# Patient Record
Sex: MALE | Race: BLACK OR AFRICAN AMERICAN | NOT HISPANIC OR LATINO | Employment: FULL TIME | URBAN - METROPOLITAN AREA
[De-identification: names, ages, dates, MRNs, and addresses within clinical notes are randomized per-mention and may not be internally consistent; named-entity substitution may affect disease eponyms.]

---

## 2022-04-27 ENCOUNTER — TRANSCRIBE ORDERS (OUTPATIENT)
Dept: PHYSICAL THERAPY | Facility: CLINIC | Age: 32
End: 2022-04-27

## 2022-04-27 DIAGNOSIS — S43.101A SEPARATION OF RIGHT ACROMIOCLAVICULAR JOINT, INITIAL ENCOUNTER: Primary | ICD-10-CM

## 2022-05-04 ENCOUNTER — TELEPHONE (OUTPATIENT)
Dept: PHYSICAL THERAPY | Facility: CLINIC | Age: 32
End: 2022-05-04

## 2022-05-10 ENCOUNTER — TELEPHONE (OUTPATIENT)
Dept: PHYSICAL THERAPY | Facility: CLINIC | Age: 32
End: 2022-05-10

## 2022-05-26 ENCOUNTER — TREATMENT (OUTPATIENT)
Dept: PHYSICAL THERAPY | Facility: CLINIC | Age: 32
End: 2022-05-26

## 2022-05-26 DIAGNOSIS — M25.511 CHRONIC RIGHT SHOULDER PAIN: Primary | ICD-10-CM

## 2022-05-26 DIAGNOSIS — G89.29 CHRONIC RIGHT SHOULDER PAIN: Primary | ICD-10-CM

## 2022-05-26 DIAGNOSIS — S43.101D SEPARATION OF RIGHT ACROMIOCLAVICULAR JOINT, SUBSEQUENT ENCOUNTER: ICD-10-CM

## 2022-05-26 PROCEDURE — 97110 THERAPEUTIC EXERCISES: CPT | Performed by: PHYSICAL THERAPIST

## 2022-05-26 PROCEDURE — 97161 PT EVAL LOW COMPLEX 20 MIN: CPT | Performed by: PHYSICAL THERAPIST

## 2022-05-26 NOTE — PROGRESS NOTES
Physical Therapy Initial Evaluation and Plan of Care    Patient: Rich Fletcher   : 1990  Diagnosis/ICD-10 Code:  Chronic right shoulder pain [M25.511, G89.29]  Referring practitioner: Salvador Reddy MD  Date of Initial Visit: 2022  Today's Date: 2022  Patient seen for 1 sessions           Subjective Questionnaire: QuickDASH: 27% impairment       Subjective Evaluation    History of Present Illness  Mechanism of injury: Pt reporting hurting his (R) shoulder years ago in 2017 and didn't have much done at the time but was told he had a pinched nerve in the shoulder. Then didn't do anything about it since then, and then he started a new job recently which exacerbated the pain in the shoulder.   Had imaging done but it is not available at time of eval.   Had an injection done on the shoulder a couple weeks ago.     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  Quality of life: good    Pain  Current pain ratin  At best pain ratin  At worst pain ratin  Quality: dull ache and discomfort  Relieving factors: change in position and medications  Aggravating factors: overhead activity, outstretched reach, repetitive movement, prolonged positioning and lifting    Patient Goals  Patient goals for therapy: decreased pain, increased motion, increased strength, independence with ADLs/IADLs and return to sport/leisure activities  Patient goal: get back to lifting weights (bench press,  press, push ups), throwing a ball            Objective          Postural Observations    Additional Postural  Observation Details  25 deg FHP at AC joint     Palpation   Left   Tenderness of the upper trapezius.     Right Tenderness of the upper trapezius.     Neurological Testing     Sensation     Shoulder   Left Shoulder   Intact: light touch    Right Shoulder   Intact: light touch    Active Range of Motion   Left Shoulder   Normal active range of motion    Right Shoulder   Normal active range of motion    Tests     Right Shoulder   Positive empty can, Hawkin's, passive horizontal adduction and sulcus sign.           Assessment & Plan     Assessment  Impairments: abnormal or restricted ROM, activity intolerance, impaired physical strength, lacks appropriate home exercise program and pain with function  Functional Limitations: carrying objects, lifting, pulling, pushing, uncomfortable because of pain, reaching behind back, reaching overhead and unable to perform repetitive tasks  Assessment details: Pt is a 31 yr/o male presenting with shoulder pain which began in 2017 and was recently exacerbated with work activities involving lifting. Per QuickDASH, he reports 27% impairment. He shows tenderness at the (R) AC joint, and positive signs for instability at the anterior shoulder as well as shoulder dyskinesia. Educated pt on importance of shoulder blade strength and reintegration of proper shoulder mechanics to prevent further injury, pt with good understanding. Educated on results of eval, as well as initial HEP. Pt with good understanding. Recommend skilled OPPT to address above issues, pt in agreement.   Prognosis: good    Goals  Plan Goals: STG: to be met within 6 visits   1. Pt to show (I) with initial HEP  2. 50% decrease in pain while lifting kids at home   3. Return to normal daily activity at work and at home with no increase in pain up to 20#   4. 50% decrease in pain with point tenderness to (R) AC joint     LTG: to be met by DC   1. Pt to be (I) with finalized HEP  2. Pt to report decreased impairment per  QuickDASH to less than 10% impairment.   3. Negative for shoulder special testing in (R) shoulder   4. Return to normal work outs including push ups and lifting with minimal to no increase in pain   5. Perform overhead lifting and throwing with no increase in pain     Plan  Therapy options: will be seen for skilled therapy services  Planned modality interventions: cryotherapy, electrical stimulation/Russian stimulation, thermotherapy (hydrocollator packs), ultrasound and dry needling  Planned therapy interventions: manual therapy, neuromuscular re-education, soft tissue mobilization, spinal/joint mobilization, strengthening, stretching, therapeutic activities, joint mobilization, home exercise program, functional ROM exercises, flexibility and body mechanics training  Frequency: 2x week  Duration in weeks: 13  Treatment plan discussed with: patient  Plan details: Depending on work schedule, may have weeks where he is only available 1x/week for in clinic session        History # of Personal Factors and/or Comorbidities: LOW (0)  Examination of Body System(s): # of elements: LOW (1-2)  Clinical Presentation: STABLE   Clinical Decision Making: LOW     Timed:         Manual Therapy:         mins  78410;     Therapeutic Exercise:    28     mins  61964;     Neuromuscular Alana:        mins  47840;    Therapeutic Activity:          mins  58904;     Gait Training:           mins  31405;     Ultrasound:          mins  27167;    Ionto                                   mins   90476  Self Care                            mins   50818  Canalith Repos         mins 95013      Un-Timed:  Electrical Stimulation:         mins  65367 ( );  Traction          mins 95712  Dry Needle                 ______ mins DRYNDL  Low Eval     14     Mins  14762  Mod Eval          Mins  58165  High Eval                            Mins  59144  Re-Eval                               mins  23591        Timed Treatment:   28   mins   Total Treatment:      50   mins    PT SIGNATURE: Susana Zepeda, SHAGGY   DATE TREATMENT INITIATED: 5/26/2022    Initial Certification  Certification Period: 5/26/2022 through 8/24/2022  I certify that the therapy services are furnished while this patient is under my care.  The services outlined above are required by this patient, and will be reviewed every 90 days.     PHYSICIAN: Salvador Reddy MD      DATE:     Please sign and return via fax to [phone]. Thank you, [facility].

## 2022-06-03 ENCOUNTER — TREATMENT (OUTPATIENT)
Dept: PHYSICAL THERAPY | Facility: CLINIC | Age: 32
End: 2022-06-03

## 2022-06-03 DIAGNOSIS — S43.101D SEPARATION OF RIGHT ACROMIOCLAVICULAR JOINT, SUBSEQUENT ENCOUNTER: ICD-10-CM

## 2022-06-03 DIAGNOSIS — M25.511 CHRONIC RIGHT SHOULDER PAIN: Primary | ICD-10-CM

## 2022-06-03 DIAGNOSIS — G89.29 CHRONIC RIGHT SHOULDER PAIN: Primary | ICD-10-CM

## 2022-06-03 PROCEDURE — 97530 THERAPEUTIC ACTIVITIES: CPT | Performed by: PHYSICAL THERAPIST

## 2022-06-03 PROCEDURE — 97110 THERAPEUTIC EXERCISES: CPT | Performed by: PHYSICAL THERAPIST

## 2022-06-03 NOTE — PROGRESS NOTES
Physical Therapy Daily Progress Note    VISIT#: 2    Subjective   Rich Fletcher reports: No issues since last session, not having much pain at home, but isn't doing his normal lifting yet.       Objective     See Exercise, Manual, and Modality Logs for complete treatment.     Patient Education: adding weight to prone scap strength     Assessment/Plan  Decreased space between AC joint pain area today compared to eval date. Able to tolerate greater resistance with prone scap strength, as well as initiation of plank press ups and cable column multidirectional pulling. No increase in pain during session. Will progress HEP as stated above. Pt with good understanding and good motivation.     Progress per Plan of Care        Timed:         Manual Therapy:         mins  78222;     Therapeutic Exercise:    16     mins  80913;     Neuromuscular Alana:        mins  43320;    Therapeutic Activity:     12     mins  23911;     Gait Training:           mins  59223;     Ultrasound:          mins  77690;    Ionto                                   mins   63363  Self Care                            mins   66448  Canalith Repos                   mins  38709    Un-Timed:  Electrical Stimulation:         mins  79083 ( );  Traction          mins 39623  Dry Needle                 ______ mins DRYNDL  Low Eval          Mins  06797  Mod Eval          Mins  53613  High Eval                            Mins  45446  Re-Eval                               mins  26334    Timed Treatment:   28   mins   Total Treatment:     28   mins    Susana Zepeda, PT    Physical Therapist

## 2022-06-09 ENCOUNTER — TREATMENT (OUTPATIENT)
Dept: PHYSICAL THERAPY | Facility: CLINIC | Age: 32
End: 2022-06-09

## 2022-06-09 DIAGNOSIS — G89.29 CHRONIC RIGHT SHOULDER PAIN: Primary | ICD-10-CM

## 2022-06-09 DIAGNOSIS — S43.101D SEPARATION OF RIGHT ACROMIOCLAVICULAR JOINT, SUBSEQUENT ENCOUNTER: ICD-10-CM

## 2022-06-09 DIAGNOSIS — M25.511 CHRONIC RIGHT SHOULDER PAIN: Primary | ICD-10-CM

## 2022-06-09 PROCEDURE — 97110 THERAPEUTIC EXERCISES: CPT | Performed by: PHYSICAL THERAPIST

## 2022-06-09 PROCEDURE — 97530 THERAPEUTIC ACTIVITIES: CPT | Performed by: PHYSICAL THERAPIST

## 2022-06-09 NOTE — PROGRESS NOTES
Physical Therapy Daily Progress Note    VISIT#: 3    Subjective   Rich Payneield reports: Shoulder is doing ok, does still feel the separation in the shoulder but it is not nearly as painful.       Objective     See Exercise, Manual, and Modality Logs for complete treatment.     Patient Education: progressions at home for strength and endurance in shoulder - importance of serratus musculature to promote scapular mobility and strength     Assessment/Plan  Pt tolerating significant increase in resistance and endurance training well today, including body weight ex on unstable surfaces. No pain during session,however pt did require ice at end of session for soreness relief. Will assess tolerance to progressions at next session.     Progress per Plan of Care        Timed:         Manual Therapy:         mins  71463;     Therapeutic Exercise:    16     mins  74274;     Neuromuscular Alana:        mins  27670;    Therapeutic Activity:     10     mins  66205;     Gait Training:           mins  24113;     Ultrasound:          mins  27282;    Ionto                                   mins   57550  Self Care                            mins   70586  Canalith Repos                   mins  69614    Un-Timed:  Electrical Stimulation:         mins  94646 ( );  Traction          mins 67940  Dry Needle                 ______ mins DRYNDL  Low Eval          Mins  29020  Mod Eval          Mins  00412  High Eval                            Mins  59362  Re-Eval                               mins  46713    Timed Treatment:   26   mins   Total Treatment:     40   mins    Susana Zepeda PT    Physical Therapist

## 2022-06-10 ENCOUNTER — TREATMENT (OUTPATIENT)
Dept: PHYSICAL THERAPY | Facility: CLINIC | Age: 32
End: 2022-06-10

## 2022-06-10 DIAGNOSIS — S43.101D SEPARATION OF RIGHT ACROMIOCLAVICULAR JOINT, SUBSEQUENT ENCOUNTER: ICD-10-CM

## 2022-06-10 DIAGNOSIS — M25.511 CHRONIC RIGHT SHOULDER PAIN: Primary | ICD-10-CM

## 2022-06-10 DIAGNOSIS — G89.29 CHRONIC RIGHT SHOULDER PAIN: Primary | ICD-10-CM

## 2022-06-10 PROCEDURE — 97530 THERAPEUTIC ACTIVITIES: CPT | Performed by: PHYSICAL THERAPIST

## 2022-06-10 PROCEDURE — 97110 THERAPEUTIC EXERCISES: CPT | Performed by: PHYSICAL THERAPIST

## 2022-06-10 NOTE — PROGRESS NOTES
Physical Therapy Daily Progress Note    VISIT#: 4    Subjective   Rich Fletcher reports: A little sore after last session yesterday but no increase in pain. Does still feel the separation feeling in the shoulder sometimes.       Objective     See Exercise, Manual, and Modality Logs for complete treatment.     Patient Education: avoiding lifting/ carrying heavy items / children without shoulder muscles being activated on right side     Assessment/Plan  Pt with improved ability to perform activation of shoulder musculature with TRX chest press and fly position. He did have one instance of shoulder feeling like it was  / suctioning feeling, however he was able to contract scapular muscle to combat this successfully.     Progress per Plan of Care        Timed:         Manual Therapy:         mins  68823;     Therapeutic Exercise:    17     mins  69871;     Neuromuscular Alana:        mins  56620;    Therapeutic Activity:     10     mins  17304;     Gait Training:           mins  45308;     Ultrasound:          mins  93875;    Ionto                                   mins   77112  Self Care                            mins   62200  Canalith Repos                   mins  10720    Un-Timed:  Electrical Stimulation:         mins  76937 ( );  Traction          mins 02868  Dry Needle                 ______ mins DRYNDL  Low Eval          Mins  63472  Mod Eval          Mins  85120  High Eval                            Mins  59820  Re-Eval                               mins  56333    Timed Treatment:   27   mins   Total Treatment:     27   mins    Susana Zepeda PT    Physical Therapist

## 2022-06-21 ENCOUNTER — TELEPHONE (OUTPATIENT)
Dept: PHYSICAL THERAPY | Facility: OTHER | Age: 32
End: 2022-06-21

## 2022-08-18 ENCOUNTER — DOCUMENTATION (OUTPATIENT)
Dept: PHYSICAL THERAPY | Facility: CLINIC | Age: 32
End: 2022-08-18

## 2022-08-18 NOTE — PROGRESS NOTES
Discharge Summary  Discharge Summary from Physical Therapy Report      Dates  PT visit: 5/26/22-6/10/22  Number of Visits: 4     Goals: All Met    Discharge Plan: Continue with current home exercise program as instructed  2  Comments : Pt cancelled remaining sessions. Clinic phoned pt and pt reporting he is doing fine and does not need anymore therapy. Will DC chart at this time. Pt not present for discharge, therefore no functional measures taken. See last note for most updated information.      Date of Discharge 8/18/22        Susana Zepeda, PT  Physical Therapist

## 2023-03-17 ENCOUNTER — DOCUMENTATION (OUTPATIENT)
Dept: PHYSICAL THERAPY | Facility: CLINIC | Age: 33
End: 2023-03-17
Payer: MEDICAID

## 2023-03-17 NOTE — PROGRESS NOTES
Discharge report was done on 8/18/22 but episode was not fully discharged on that date. Full discharge of episode of care at this time.

## 2023-06-14 ENCOUNTER — HOSPITAL ENCOUNTER (OUTPATIENT)
Facility: HOSPITAL | Age: 33
Discharge: HOME | End: 2023-06-14
Attending: PEDIATRICS
Payer: MEDICAID

## 2023-06-14 VITALS
OXYGEN SATURATION: 97 % | TEMPERATURE: 97.7 F | HEIGHT: 75 IN | HEART RATE: 81 BPM | WEIGHT: 255 LBS | DIASTOLIC BLOOD PRESSURE: 82 MMHG | RESPIRATION RATE: 16 BRPM | BODY MASS INDEX: 31.71 KG/M2 | SYSTOLIC BLOOD PRESSURE: 130 MMHG
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
## 2023-06-14 PROCEDURE — 63710000001 PREDNISONE PER 1 MG: Performed by: PEDIATRICS

## 2023-06-14 PROCEDURE — G0463 HOSPITAL OUTPT CLINIC VISIT: HCPCS | Performed by: PEDIATRICS

## 2023-06-14 RX ORDER — PREDNISONE 10 MG/1
TABLET ORAL
Qty: 21 TABLET | Refills: 0 | Status: SHIPPED | OUTPATIENT
Start: 2023-06-14 | End: 2023-06-20

## 2023-06-14 RX ORDER — CEPHALEXIN 500 MG/1
500 CAPSULE ORAL 4 TIMES DAILY
Qty: 20 CAPSULE | Refills: 0 | Status: SHIPPED | OUTPATIENT
Start: 2023-06-14 | End: 2023-06-19

## 2023-06-14 RX ORDER — PREDNISONE 20 MG/1
60 TABLET ORAL ONCE
Status: COMPLETED | OUTPATIENT
Start: 2023-06-14 | End: 2023-06-14

## 2023-06-14 RX ADMIN — PREDNISONE 60 MG: 20 TABLET ORAL at 20:55

## 2023-06-14 NOTE — Clinical Note
[facility] FS[facility] [place] [address] [place] [address] [place] [address]  Phone: [phone]    Rich Fletcher was seen and treated in our emergency department on 6/14/2023.  He may return to work on 06/17/2023.         Thank you for choosing [facility].    Alberto Pinto, DO

## 2023-06-15 NOTE — FSED PROVIDER NOTE
Emergency Medicine Evaluation Note  Subjective   History of Present Illness    HPI: Rich Fletcher is a 32 y.o. male who presents to the ED with rash that occurred roughly 3 days prior to presentation intermittent spots along the backs of knees bilaterally, lateral thigh, right inguinal canal region.  Patient states initially itching, states formed vesicular appearing lesions, states with serous drainage.  Patient denies any systemic signs of infection such as fevers, chills, nausea or vomiting.  No history of similar type symptoms.  No known exposures.  No immunocompromise status.  No OTC medications prior to arrival. No other concomitant symptoms. No other known aggravating or alleviating factors.      ROS  Review of Systems   Constitutional: Negative.    HENT: Negative.    Eyes: Negative.    Respiratory: Negative.    Cardiovascular: Negative.    Gastrointestinal: Negative.    Endocrine: Negative.    Genitourinary: Negative.    Musculoskeletal: Negative.    Skin: Positive for rash.   Allergic/Immunologic: Negative.    Neurological: Negative.    Hematological: Negative.    Psychiatric/Behavioral: Negative.        Previous History:  History reviewed. No pertinent past medical history.  History reviewed. No pertinent surgical history.  Social History     Tobacco Use   • Smoking status: Former     Types: Cigarettes   Substance Use Topics   • Alcohol use: Not Currently   • Drug use: Not Currently     History reviewed. No pertinent family history.  No Known Allergies  Current Outpatient Medications   Medication Instructions   • cephalexin (KEFLEX) 500 mg, Oral, 4 Times Daily   • predniSONE (DELTASONE) 10 MG tablet Take 6 tablets by mouth Daily for 1 day, THEN 5 tablets Daily for 1 day, THEN 4 tablets Daily for 1 day, THEN 3 tablets Daily for 1 day, THEN 2 tablets Daily for 1 day, THEN 1 tablet Daily for 1 day.       Objective   Physical Exam  Patient Vitals for the past 24 hrs:   BP Temp Temp src Pulse Resp SpO2  "Height Weight   06/14/23 2017 130/82 97.7 °F (36.5 °C) Oral 81 16 97 % 190.5 cm (75\") 116 kg (255 lb)     Physical Exam  Vitals and nursing note reviewed.   Constitutional:       General: He is not in acute distress.     Appearance: He is normal weight. He is not toxic-appearing.   HENT:      Head: Normocephalic and atraumatic.      Nose: Nose normal. No congestion or rhinorrhea.      Mouth/Throat:      Mouth: Mucous membranes are moist.      Pharynx: Oropharynx is clear. No oropharyngeal exudate.   Eyes:      General:         Right eye: No discharge.         Left eye: No discharge.      Extraocular Movements: Extraocular movements intact.      Conjunctiva/sclera: Conjunctivae normal.   Cardiovascular:      Rate and Rhythm: Normal rate and regular rhythm.      Pulses: Normal pulses.   Pulmonary:      Effort: Pulmonary effort is normal. No respiratory distress.   Abdominal:      General: Abdomen is flat.      Palpations: Abdomen is soft.   Musculoskeletal:         General: No swelling, tenderness, deformity or signs of injury. Normal range of motion.      Cervical back: Normal range of motion and neck supple. No rigidity.      Right lower leg: No edema.      Left lower leg: No edema.   Skin:     General: Skin is warm and dry.      Capillary Refill: Capillary refill takes less than 2 seconds.      Comments: Intermittent areas of less than 1 cm of blanchable erythema with central excoriation without palpable pocket of fluctuance.  No bleeding drainage, discharge or weep.  No surrounding streaking.  Compartments are soft.  No crepitus.  Roughly 3 lesions in right inguinal canal without involvement of perineal space.,  1 lesion on the lateral thigh each, one behind popliteal fossa.    Neurological:      General: No focal deficit present.      Mental Status: He is alert and oriented to person, place, and time.      Cranial Nerves: No cranial nerve deficit.      Sensory: No sensory deficit.      Motor: No weakness.      " Coordination: Coordination normal.      Gait: Gait normal.   Psychiatric:         Mood and Affect: Mood normal.         Behavior: Behavior normal.         Results  Labs Reviewed - No data to display  No orders to display     The laboratory results, imaging results and other diagnostic exam results were reviewed in the EMR.     Procedures  Procedures    Medical Decision Making    Patient presents to the ED as described above.  Vital signs stable and unremarkable.  Physical exam as described above.  Patient without any known exposure history, sporadic nature does not fit classic insect bite pattern however lesions appear consistent with insect bite, will initiate treatment with dose of steroids here, short prednisone taper.  Lesions do not look infected at this time, patient provided with paper prescription for Keflex in the event of bacterial superinfection, discussed when and how to properly initiate medication if necessary. Patient informed of results.  They verbalized understanding and agreement with treatment care plan.  Given strict return precautions.  All questions answered.    Diagnosis  Final diagnoses:   Insect bite, unspecified site, initial encounter       Disposition  ED Disposition     ED Disposition   Discharge    Condition   Stable    Comment   --           Erick Quick, DO  [address] [facility] [place] [address] [phone]    Call in 1 week  contunity of care    [place] [address] [facility] [address] [phone]  Go to   As needed, If symptoms worsen

## 2023-08-23 ENCOUNTER — HOSPITAL ENCOUNTER (OUTPATIENT)
Facility: HOSPITAL | Age: 33
Discharge: HOME | End: 2023-08-23
Attending: STUDENT IN AN ORGANIZED HEALTH CARE EDUCATION/TRAINING PROGRAM | Admitting: STUDENT IN AN ORGANIZED HEALTH CARE EDUCATION/TRAINING PROGRAM
Payer: MEDICAID

## 2023-08-23 VITALS
BODY MASS INDEX: 30.71 KG/M2 | DIASTOLIC BLOOD PRESSURE: 77 MMHG | OXYGEN SATURATION: 99 % | WEIGHT: 247 LBS | SYSTOLIC BLOOD PRESSURE: 135 MMHG | RESPIRATION RATE: 18 BRPM | TEMPERATURE: 98.7 F | HEIGHT: 75 IN | HEART RATE: 93 BPM

## 2023-08-23 DIAGNOSIS — U07.1 COVID-19: Primary | ICD-10-CM

## 2023-08-23 LAB
FLUAV SUBTYP SPEC NAA+PROBE: NOT DETECTED
FLUBV RNA ISLT QL NAA+PROBE: NOT DETECTED
SARS-COV-2 RNA RESP QL NAA+PROBE: DETECTED

## 2023-08-23 PROCEDURE — G0463 HOSPITAL OUTPT CLINIC VISIT: HCPCS

## 2023-08-23 PROCEDURE — 87636 SARSCOV2 & INF A&B AMP PRB: CPT | Performed by: STUDENT IN AN ORGANIZED HEALTH CARE EDUCATION/TRAINING PROGRAM

## 2023-08-23 NOTE — DISCHARGE INSTRUCTIONS
Thank you for letting us care for you today.  You can use Tylenol and ibuprofen as needed for pain and fever.  Drink plenty fluids and get rest.  You can use over-the-counter medications as needed for your symptoms.  Follow-up with your primary care provider.  Return for any new or worsening symptoms.  Quarantine at home for 5 days and wear a mask for 5 days from the onset of symptoms.      Wash/sanitize common household surfaces with antibacterial wipes.  Especially door knobs, light switches. Change bed linens and wash bath towels/washcloths. Frequent handwashing. Cough/sneeze into your sleeve. Treat fever every 6-8 hours with adult/children Tylenol (generic acetaminophen)

## 2023-08-23 NOTE — Clinical Note
[facility] [place] [address] [place] [address] [place] [address]  Phone: [phone]    Rich Fletcher was seen and treated in our emergency department on 8/23/2023.  He may return to work on 08/28/2023.  Quarantine at home until August 28 and then wear a mask for 5 days.       Thank you for choosing [facility].    Toya Ann APRN

## 2023-08-23 NOTE — FSED PROVIDER NOTE
[facility]    EMERGENCY DEPARTMENT ENCOUNTER    Room Number:  09/09  Date seen:  8/23/2023  Time seen: 17:38 EDT  PCP: Erick Quick DO  Historian: Patient    HPI:  Chief complaint: Cough  Context:Rich Fletcher is a 32 y.o. male who presents to the ED with c/o cough.  Patient reports that he has been having a global headache with fatigue and cough.  He reports he tested positive for COVID yesterday.  He reports that he is here today to get documentation of a positive COVID test.  He denies any chest pain or shortness of breath, nausea vomiting diarrhea or abdominal pain.  Patient is nontoxic in appearance.  He reports he has been taking over-the-counter medications with relief of some of his symptoms.     Timing: Constant  Duration: Since yesterday  Location: Global  Radiation: Nonradiating  Quality: Aching  Intensity/Severity: Mild  Associated Symptoms: Headache, fatigue, cough  Aggravating Factors: No known aggravating  Alleviating Factors: Over-the-counter medications  Treatment before arrival: OTC cold and flu medications    MEDICAL RECORD REVIEW  None reported    ALLERGIES  Patient has no known allergies.    PAST MEDICAL HISTORY  Active Ambulatory Problems     Diagnosis Date Noted    No Active Ambulatory Problems     Resolved Ambulatory Problems     Diagnosis Date Noted    No Resolved Ambulatory Problems     No Additional Past Medical History       PAST SURGICAL HISTORY  History reviewed. No pertinent surgical history.    FAMILY HISTORY  History reviewed. No pertinent family history.    SOCIAL HISTORY  Social History     Socioeconomic History    Marital status:    Tobacco Use    Smoking status: Some Days     Types: Cigarettes    Tobacco comments:     Currently trying to quit   Vaping Use    Vaping Use: Never used   Substance and Sexual Activity    Alcohol use: Not Currently    Drug use: Not Currently    Sexual activity: Defer       REVIEW OF SYSTEMS  Review  of Systems    All systems reviewed and negative except for those discussed in HPI.     PHYSICAL EXAM    I have reviewed the triage vital signs and nursing notes.    ED Triage Vitals   Temp Heart Rate Resp BP SpO2   08/23/23 1700 08/23/23 1638 08/23/23 1700 08/23/23 1700 08/23/23 1638   98.7 øF (37.1 øC) 101 18 135/77 98 %      Temp src Heart Rate Source Patient Position BP Location FiO2 (%)   08/23/23 1700 08/23/23 1638 08/23/23 1700 08/23/23 1700 --   Temporal Monitor;Right Sitting Right arm        Physical Exam  Constitutional:       Appearance: Normal appearance.   HENT:      Head: Normocephalic.      Right Ear: Tympanic membrane normal.      Left Ear: Tympanic membrane normal.      Nose: Nose normal.      Mouth/Throat:      Mouth: Mucous membranes are moist.      Pharynx: Oropharynx is clear.   Eyes:      Extraocular Movements: Extraocular movements intact.      Pupils: Pupils are equal, round, and reactive to light.   Cardiovascular:      Rate and Rhythm: Normal rate and regular rhythm.      Pulses: Normal pulses.      Heart sounds: Normal heart sounds.   Pulmonary:      Effort: Pulmonary effort is normal.      Breath sounds: Normal breath sounds.   Musculoskeletal:         General: Normal range of motion.      Cervical back: Normal range of motion.   Skin:     General: Skin is warm.   Neurological:      General: No focal deficit present.      Mental Status: He is alert.   Psychiatric:         Mood and Affect: Mood normal.         Behavior: Behavior normal.       Vital signs and nursing notes reviewed.        LAB RESULTS  Recent Results (from the past 24 hour(s))   COVID-19 and FLU A/B PCR - Swab, Nasopharynx    Collection Time: 08/23/23  5:04 PM    Specimen: Nasopharynx; Swab   Result Value Ref Range    COVID19 Detected (C) Not Detected - Ref. Range    Influenza A PCR Not Detected Not Detected    Influenza B PCR Not Detected Not Detected       Ordered the above labs and independently reviewed the  results.      RADIOLOGY RESULTS  No Radiology Exams Resulted Within Past 24 Hours       I ordered the above noted radiological studies. Independently reviewed by me and discussed with radiologist.  See dictation above for official radiology interpretation.      Orders placed during this visit:  Orders Placed This Encounter   Procedures    COVID-19 and FLU A/B PCR - Swab, Nasopharynx           PROCEDURES    Procedures        MEDICATIONS GIVEN IN ER    Medications - No data to display      PROGRESS, DATA ANALYSIS, CONSULTS, AND MEDICAL DECISION MAKING    All labs have been independently reviewed by me.  All radiology studies have been reviewed by me.   EKG's independently reviewed by me.  Discussion below represents my analysis of pertinent findings related to patient's condition, differential diagnosis, treatment plan and final disposition.    I rechecked the patient.  I discussed the patient's labs, radiology findings (including all incidental findings), diagnosis, and plan for discharge.  A repeat exam reveals no new worrisome changes from my initial exam findings.  The patient understands that the fact that they are being discharged does not denote that nothing is abnormal, it indicates that no clinical emergency is present and that they must follow-up as directed in order to properly maintain their health.  Follow-up instructions (specifically listed below) and return to ER precautions were given at this time.  I specifically instructed the patient to follow-up with their PCP.  The patient understands and agrees with the plan, and is ready for discharge.  All questions answered.    ED Course as of 08/23/23 1740   Wed Aug 23, 2023   1728 COVID19(!!): Detected [KJ]   1728 Influenza A PCR: Not Detected [KJ]   1728 Influenza B PCR: Not Detected [KJ]      ED Course User Index  [KJ] Toya Ann APRN       AS OF 17:40 EDT VITALS:    BP - 135/77  HR - 93  TEMP - 98.7 øF (37.1 øC) (Temporal)  02 SATS -  99%    Medical Decision Making  MEDICAL DECISION  Lab interpretation:  Labs viewed by me significant for, positive COVID    Patient meets criteria to test for COVID-19. Doubt PNA, sepsis, other serious bacterial infection or acute emergent condition. Is otherwise well-appearing with acceptable vitals, a reassuring physical exam, and is safe to discharge home.  Provide strict return precautions and instructions on self-isolation/quarantine and anticipatory guidance.  Patient is already taking over-the-counter medications I encouraged him to continue those.  He can follow-up with his primary care provider as needed and return for any new or worsening symptoms.  We discussed red flag symptoms when to return to the emergency room.  The patient is agreeable and denies any questions at this time.          Problems Addressed:  COVID-19: acute illness or injury    Amount and/or Complexity of Data Reviewed  Labs:  Decision-making details documented in ED Course.          DIAGNOSIS  Final diagnoses:   COVID-19       No orders of the defined types were placed in this encounter.          I performed hand hygiene on entry into the pt room and upon exit.     Note Disclaimer: At [facility], we believe that sharing information builds trust and better  relationships. You are receiving this note because you recently visited [facility]. It is possible you will see health information before a provider has talked with you about it. This kind of information can be easy to misunderstand. To help you fully understand what it means for your health, we urge you to discuss this note with your provider.         Part of this note may be an electronic transcription/translation of spoken language to printed text using the Dragon Dictation System.     Appropriate PPE worn during exam.    Dictated utilizing Dragon dictation     Note Disclaimer: At [facility], we believe that sharing information builds trust and better relationships. You  are receiving this note because you recently visited [facility]. It is possible you will see health information before a provider has talked with you about it. This kind of information can be easy to misunderstand. To help you fully understand what it means for your health, we urge you to discuss this note with your provider.

## 2024-02-02 ENCOUNTER — HOSPITAL ENCOUNTER (OUTPATIENT)
Facility: HOSPITAL | Age: 34
Discharge: HOME | End: 2024-02-02
Attending: EMERGENCY MEDICINE | Admitting: EMERGENCY MEDICINE
Payer: MEDICAID

## 2024-02-02 VITALS
BODY MASS INDEX: 29.84 KG/M2 | SYSTOLIC BLOOD PRESSURE: 126 MMHG | TEMPERATURE: 98.4 F | HEART RATE: 87 BPM | RESPIRATION RATE: 18 BRPM | DIASTOLIC BLOOD PRESSURE: 78 MMHG | OXYGEN SATURATION: 98 % | HEIGHT: 75 IN | WEIGHT: 240 LBS

## 2024-02-02 DIAGNOSIS — Z20.818 STREP THROAT EXPOSURE: ICD-10-CM

## 2024-02-02 DIAGNOSIS — J02.9 SORE THROAT: Primary | ICD-10-CM

## 2024-02-02 LAB
FLUAV SUBTYP SPEC NAA+PROBE: NOT DETECTED
FLUBV RNA ISLT QL NAA+PROBE: NOT DETECTED
SARS-COV-2 RNA RESP QL NAA+PROBE: NOT DETECTED
STREP A PCR: NOT DETECTED

## 2024-02-02 PROCEDURE — 99213 OFFICE O/P EST LOW 20 MIN: CPT

## 2024-02-02 PROCEDURE — 87651 STREP A DNA AMP PROBE: CPT | Performed by: EMERGENCY MEDICINE

## 2024-02-02 PROCEDURE — 87636 SARSCOV2 & INF A&B AMP PRB: CPT

## 2024-02-02 PROCEDURE — 87636 SARSCOV2 & INF A&B AMP PRB: CPT | Performed by: EMERGENCY MEDICINE

## 2024-02-02 PROCEDURE — 87651 STREP A DNA AMP PROBE: CPT

## 2024-02-02 PROCEDURE — 25010000002 DEXAMETHASONE PER 1 MG

## 2024-02-02 PROCEDURE — G0463 HOSPITAL OUTPT CLINIC VISIT: HCPCS

## 2024-02-02 RX ORDER — AZITHROMYCIN 500 MG/1
500 TABLET, FILM COATED ORAL DAILY
Qty: 5 TABLET | Refills: 0 | Status: SHIPPED | OUTPATIENT
Start: 2024-02-02

## 2024-02-02 RX ADMIN — DEXAMETHASONE SODIUM PHOSPHATE 10 MG: 10 INJECTION INTRAMUSCULAR; INTRAVENOUS at 10:13

## 2024-02-02 NOTE — FSED PROVIDER NOTE
Subjective   History of Present Illness  Chief Complaint: Sore throat, fever      HPI: Patient is a 33-year-old male who presents by private vehicle with complaints of sore throat, fever Tmax 101, fatigue symptoms started 3 days ago.    PCP: Abdelrahman    History provided by:  Patient      Review of Systems   Constitutional:  Positive for fatigue and fever.   HENT:  Positive for sore throat.        History reviewed. No pertinent past medical history.    No Known Allergies    History reviewed. No pertinent surgical history.    History reviewed. No pertinent family history.    Social History     Socioeconomic History    Marital status:    Tobacco Use    Smoking status: Some Days     Types: Cigarettes    Tobacco comments:     Currently trying to quit   Vaping Use    Vaping Use: Never used   Substance and Sexual Activity    Alcohol use: Not Currently    Drug use: Not Currently    Sexual activity: Defer           Objective   Physical Exam  Vitals reviewed.   Constitutional:       Appearance: He is not toxic-appearing.   HENT:      Head: Normocephalic.      Right Ear: Tympanic membrane and ear canal normal.      Left Ear: Tympanic membrane and ear canal normal.      Nose: No congestion.      Mouth/Throat:      Mouth: Mucous membranes are moist.      Pharynx: Uvula midline. Posterior oropharyngeal erythema present. No pharyngeal swelling or oropharyngeal exudate.      Tonsils: No tonsillar exudate or tonsillar abscesses.   Eyes:      Conjunctiva/sclera: Conjunctivae normal.   Neck:      Thyroid: No thyromegaly.   Cardiovascular:      Rate and Rhythm: Normal rate.      Pulses: Normal pulses.      Heart sounds: Normal heart sounds.   Pulmonary:      Effort: Pulmonary effort is normal.   Abdominal:      General: Bowel sounds are normal.   Musculoskeletal:         General: Normal range of motion.      Cervical back: Normal range of motion and neck supple.   Lymphadenopathy:      Cervical: No cervical adenopathy.   Skin:      "General: Skin is warm and dry.   Neurological:      General: No focal deficit present.      Mental Status: He is alert and oriented to person, place, and time. Mental status is at baseline.         Procedures           ED Course      /78 (BP Location: Left arm, Patient Position: Sitting)   Pulse 87   Temp 98.4 °F (36.9 °C) (Oral)   Resp 18   Ht 190.5 cm (75\")   Wt 109 kg (240 lb)   SpO2 98%   BMI 30.00 kg/m²   Labs Reviewed   RAPID STREP A SCREEN - Normal   COVID-19 AND FLU A/B, NP SWAB IN TRANSPORT MEDIA 1 HR TAT - Normal    Narrative:     Fact sheet for providers: https://www.fda.gov/media/901063/download    Fact sheet for patients: https://www.fda.gov/media/770948/download    Test performed by PCR.     Medications   dexAMETHasone (DECADRON) 10 MG/ML oral solution 10 mg (10 mg Oral Given 2/2/24 1013)     No radiology results for the last day                                       Medical Decision Making  Patient presented with above complaints on physical exam no acute distress there is no evidence of peritonsillar abscess he has tested negative for strep as well as COVID and influenza though with his presenting symptoms and febrile state at home as well as recent exposure to daughter who is strep positive he will be given a dose of Decadron with a prescription for Zithromax sent to his preferred pharmacy.  Advised to follow-up with PCP we discussed worrisome symptoms to monitor for and when to return to the emergency room.  Patient gave verbal understanding of plan of care.  He was alert oriented nontoxic with stable vitals at time of discharge, denied further questions or complaints.    Chart review: 3/31/2023 outpatient visit related to cough, runny nose.      Note Disclaimer: At [facility], we believe that sharing information builds trust and better  relationships. You are receiving this note because you recently visited [facility]. It is possible you will see health information before a " provider has talked with you about it. This kind of information can be easy to misunderstand. To help you fully understand what it means for your health, we urge you to discuss this note with your provider.       Part of this note may be an electronic transcription/translation of spoken language to printed text using the Dragon Dictation System.    Appropriate PPE worn during exam.    Amount and/or Complexity of Data Reviewed  External Data Reviewed: notes.        Final diagnoses:   Sore throat   Strep throat exposure       ED Disposition  ED Disposition       ED Disposition   Discharge    Condition   Stable    Comment   --               Erick Quick, DO  [address] [place] [address] [phone]               Medication List        New Prescriptions      azithromycin 500 MG tablet  Commonly known as: ZITHROMAX  Take 1 tablet by mouth Daily.               Where to Get Your Medications        These medications were sent to [id number] - [place], [place] - [address] [place] [address] - [phone]  - [phone] [address] [place] [address]      Hours: 24-hours Phone: [phone]   azithromycin 500 MG tablet

## 2024-02-02 NOTE — DISCHARGE INSTRUCTIONS
Prescription for antibiotics sent to your preferred pharmacy use as directed.    Follow-up with PCP  Over-the-counter cold and flu medications, Tylenol or ibuprofen Chloraseptic sprays.    Return to the ER for new or worsening symptoms

## 2024-02-02 NOTE — Clinical Note
[facility] FSED [place] [address] [place] [address] [place] [address]  Phone: [phone]    Rich Fletcher was seen and treated in our emergency department on 2/2/2024.  He may return to work on 02/05/2024.         Thank you for choosing [facility].    Eugenia Gunn APRN

## 2024-03-11 ENCOUNTER — HOSPITAL ENCOUNTER (OUTPATIENT)
Facility: HOSPITAL | Age: 34
Discharge: HOME | End: 2024-03-11
Attending: EMERGENCY MEDICINE | Admitting: EMERGENCY MEDICINE
Payer: MEDICAID

## 2024-03-11 VITALS
DIASTOLIC BLOOD PRESSURE: 75 MMHG | HEART RATE: 67 BPM | TEMPERATURE: 97.7 F | BODY MASS INDEX: 28.85 KG/M2 | RESPIRATION RATE: 18 BRPM | SYSTOLIC BLOOD PRESSURE: 131 MMHG | WEIGHT: 232 LBS | OXYGEN SATURATION: 97 % | HEIGHT: 75 IN

## 2024-03-11 DIAGNOSIS — B34.9 VIRAL ILLNESS: Primary | ICD-10-CM

## 2024-03-11 PROCEDURE — G0463 HOSPITAL OUTPT CLINIC VISIT: HCPCS

## 2024-03-11 PROCEDURE — 87651 STREP A DNA AMP PROBE: CPT

## 2024-03-11 PROCEDURE — 87636 SARSCOV2 & INF A&B AMP PRB: CPT | Performed by: EMERGENCY MEDICINE

## 2024-03-11 PROCEDURE — 63710000001 ONDANSETRON ODT 4 MG TABLET DISPERSIBLE

## 2024-03-11 RX ORDER — ONDANSETRON 4 MG/1
4 TABLET, ORALLY DISINTEGRATING ORAL ONCE
Status: COMPLETED | OUTPATIENT
Start: 2024-03-11 | End: 2024-03-11

## 2024-03-11 RX ORDER — ONDANSETRON 4 MG/1
4 TABLET, ORALLY DISINTEGRATING ORAL EVERY 8 HOURS PRN
Qty: 12 TABLET | Refills: 0 | Status: SHIPPED | OUTPATIENT
Start: 2024-03-11

## 2024-03-11 RX ADMIN — ONDANSETRON 4 MG: 4 TABLET, ORALLY DISINTEGRATING ORAL at 14:20

## 2024-03-11 NOTE — Clinical Note
[facility] [place] [address] [place] [address] [place] [address]  Phone: [phone]    Rich Fletcher was seen and treated in our emergency department on 3/11/2024.  He may return to work on 03/12/2024.         Thank you for choosing [facility].    Toya Ann APRN

## 2024-03-11 NOTE — Clinical Note
[facility] [place] [address] [place] [address] [place] [address]  Phone: [phone]    Rich Fletcher was seen and treated in our emergency department on 3/11/2024.  He may return to work on 03/13/2024.         Thank you for choosing [facility].    Toya Ann APRN

## 2024-03-11 NOTE — FSED PROVIDER NOTE
[facility]    EMERGENCY DEPARTMENT ENCOUNTER    Room Number:  11/11  Date seen:  3/11/2024  Time seen: 14:24 EDT  PCP: Erick Quick DO  Historian: Patient    HPI:  Chief complaint: Diarrhea  Context:Rich Fletcher is a 33 y.o. male who presents to the ED with c/o diarrhea.  Patient reports that he started to have some abdominal pain diarrhea with some upset stomach.  He reports that his wife and children were recently diagnosed with a stomach bug.  He denies any abdominal pain, fever, urinary symptoms.  Patient is nontoxic in appearance.  He reports he has not taken anything for his symptoms.  Patient also reports that he feels like there is something in his throat when he swallows.  Patient reports that he has had a decreased appetite today.    Timing: Constant  Duration: Today  Location: Abdomen  Radiation: Nonradiating  Quality: Nausea  Intensity/Severity: Mild  Associated Symptoms: Diarrhea, nausea, exposure to the stomach bug  Aggravating Factors: No known aggravating  Alleviating Factors: No attempted home treatment      MEDICAL RECORD REVIEW  No chronic medical history reported    ALLERGIES  Patient has no known allergies.    PAST MEDICAL HISTORY  Active Ambulatory Problems     Diagnosis Date Noted    No Active Ambulatory Problems     Resolved Ambulatory Problems     Diagnosis Date Noted    No Resolved Ambulatory Problems     No Additional Past Medical History       PAST SURGICAL HISTORY  History reviewed. No pertinent surgical history.    FAMILY HISTORY  History reviewed. No pertinent family history.    SOCIAL HISTORY  Social History     Socioeconomic History    Marital status:    Tobacco Use    Smoking status: Some Days     Types: Cigarettes    Tobacco comments:     Currently trying to quit   Vaping Use    Vaping status: Never Used   Substance and Sexual Activity    Alcohol use: Not Currently    Drug use: Not Currently    Sexual activity: Defer        REVIEW OF SYSTEMS  Review of Systems    All systems reviewed and negative except for those discussed in HPI.     PHYSICAL EXAM    I have reviewed the triage vital signs and nursing notes.    ED Triage Vitals [03/11/24 1354]   Temp Heart Rate Resp BP SpO2   97.7 °F (36.5 °C) 67 18 131/75 97 %      Temp src Heart Rate Source Patient Position BP Location FiO2 (%)   -- Monitor Sitting Right arm --       Physical Exam  Constitutional:       Appearance: Normal appearance. He is not toxic-appearing.   HENT:      Head: Normocephalic.      Right Ear: Tympanic membrane and ear canal normal.      Left Ear: Tympanic membrane and ear canal normal.      Nose: Nose normal.      Mouth/Throat:      Mouth: Mucous membranes are moist.      Pharynx: No oropharyngeal exudate or posterior oropharyngeal erythema.   Eyes:      Pupils: Pupils are equal, round, and reactive to light.   Cardiovascular:      Rate and Rhythm: Normal rate and regular rhythm.      Pulses: Normal pulses.      Heart sounds: Normal heart sounds.   Pulmonary:      Effort: Pulmonary effort is normal.      Breath sounds: Normal breath sounds.   Abdominal:      General: Bowel sounds are normal.      Palpations: Abdomen is soft.      Tenderness: There is no abdominal tenderness. There is no guarding or rebound.   Musculoskeletal:         General: Normal range of motion.      Cervical back: Normal range of motion.   Skin:     General: Skin is warm.   Neurological:      General: No focal deficit present.      Mental Status: He is alert.   Psychiatric:         Mood and Affect: Mood normal.         Behavior: Behavior normal.         Vital signs and nursing notes reviewed.        LAB RESULTS  Recent Results (from the past 24 hour(s))   COVID-19 and FLU A/B PCR, 1 HR TAT - Swab, Nasopharynx    Collection Time: 03/11/24  1:55 PM    Specimen: Nasopharynx; Swab   Result Value Ref Range    COVID19 Not Detected Not Detected - Ref. Range    Influenza A PCR Not Detected Not  Detected    Influenza B PCR Not Detected Not Detected   Rapid Strep A Screen - Swab, Throat    Collection Time: 03/11/24  2:20 PM    Specimen: Throat; Swab   Result Value Ref Range    STREP A PCR Not Detected Not Detected       Ordered the above labs and independently reviewed the results.      RADIOLOGY RESULTS  No Radiology Exams Resulted Within Past 24 Hours       I ordered the above noted radiological studies. Independently reviewed by me and discussed with radiologist.  See dictation above for official radiology interpretation.      Orders placed during this visit:  Orders Placed This Encounter   Procedures    COVID-19 and FLU A/B PCR, 1 HR TAT - Swab, Nasopharynx    Rapid Strep A Screen - Swab, Throat           PROCEDURES    Procedures        MEDICATIONS GIVEN IN ER    Medications   ondansetron ODT (ZOFRAN-ODT) disintegrating tablet 4 mg (4 mg Oral Given 3/11/24 1420)         PROGRESS, DATA ANALYSIS, CONSULTS, AND MEDICAL DECISION MAKING    All labs have been independently reviewed by me.  All radiology studies have been reviewed by me.   EKG's independently reviewed by me.  Discussion below represents my analysis of pertinent findings related to patient's condition, differential diagnosis, treatment plan and final disposition.    I rechecked the patient.  I discussed the patient's labs, radiology findings (including all incidental findings), diagnosis, and plan for discharge.  A repeat exam reveals no new worrisome changes from my initial exam findings.  The patient understands that the fact that they are being discharged does not denote that nothing is abnormal, it indicates that no clinical emergency is present and that they must follow-up as directed in order to properly maintain their health.  Follow-up instructions (specifically listed below) and return to ER precautions were given at this time.  I specifically instructed the patient to follow-up with their PCP.  The patient understands and agrees with  the plan, and is ready for discharge.  All questions answered.    ED Course as of 03/11/24 1452   Mon Mar 11, 2024   1420 COVID19: Not Detected [KJ]   1420 Influenza A PCR: Not Detected [KJ]   1420 Influenza B PCR: Not Detected [KJ]      ED Course User Index  [KJ] Toya Ann APRN       AS OF 14:52 EDT VITALS:    BP - 131/75  HR - 67  TEMP - 97.7 °F (36.5 °C)  02 SATS - 97%    Medical Decision Making  MEDICAL DECISION  Lab interpretation:  Labs viewed by me significant for, negative COVID influenza strep    Patient is a 33-year-old male who presents today with nausea, diarrhea.  Patient reports that his children and wife have been sick with a similar symptom.  Presentation most consistent with viral syndrome.  Patient was negative for COVID influenza and strep testing today.  He was given a dose of Zofran in the emergency room with improvement.  Based on vitals and exam they are nontoxic and stable for discharge.    Given history and exam I have a lower suspicion for: Emergent cardiopulmonary processes such as such as acute asthma or COPD exacerbation, PE, pneumonia, emergent otopharyngeal causes.  Patient will be sent home with a prescription for Zofran.  We discussed his discharge instructions.  Patient to follow-up with his primary care provider as needed.  He was given return precautions with understanding.  Patient was provided a work note.    Problems Addressed:  Viral illness: complicated acute illness or injury    Amount and/or Complexity of Data Reviewed  Labs:  Decision-making details documented in ED Course.    Risk  Prescription drug management.          DIAGNOSIS  Final diagnoses:   Viral illness       New Medications Ordered This Visit   Medications    ondansetron ODT (ZOFRAN-ODT) disintegrating tablet 4 mg    ondansetron ODT (ZOFRAN-ODT) 4 MG disintegrating tablet     Sig: Place 1 tablet on the tongue Every 8 (Eight) Hours As Needed for Nausea or Vomiting.     Dispense:  12 tablet     Refill:   0           I performed hand hygiene on entry into the pt room and upon exit.      Part of this note may be an electronic transcription/translation of spoken language to printed text using the Dragon Dictation System.     Appropriate PPE worn during exam.    Dictated utilizing Dragon dictation     Note Disclaimer: At [facility], we believe that sharing information builds trust and better relationships. You are receiving this note because you recently visited [facility]. It is possible you will see health information before a provider has talked with you about it. This kind of information can be easy to misunderstand. To help you fully understand what it means for your health, we urge you to discuss this note with your provider.

## 2024-03-11 NOTE — DISCHARGE INSTRUCTIONS
Thank you for letting us care for you today.  Avoid any fried, fatty, greasy, spicy foods.  Eat a bland diet then advance as tolerated.  Take small frequent sips of fluids.  You can use Zofran as needed for nausea and vomiting.  You can use over-the-counter medications such as Imodium as needed for your diarrhea.  Please follow-up with your primary care provider as needed for continued evaluation.  Return to the emergency room for any abdominal pain, persistent vomiting, fever or any other concerning symptoms.

## 2024-04-29 ENCOUNTER — HOSPITAL ENCOUNTER (OUTPATIENT)
Facility: HOSPITAL | Age: 34
Discharge: HOME | End: 2024-04-29
Attending: EMERGENCY MEDICINE | Admitting: EMERGENCY MEDICINE
Payer: MEDICAID

## 2024-04-29 ENCOUNTER — APPOINTMENT (OUTPATIENT)
Dept: GENERAL RADIOLOGY | Facility: HOSPITAL | Age: 34
End: 2024-04-29
Payer: MEDICAID

## 2024-04-29 VITALS
BODY MASS INDEX: 29.13 KG/M2 | HEART RATE: 67 BPM | RESPIRATION RATE: 18 BRPM | SYSTOLIC BLOOD PRESSURE: 139 MMHG | OXYGEN SATURATION: 99 % | WEIGHT: 234.3 LBS | TEMPERATURE: 98.2 F | HEIGHT: 75 IN | DIASTOLIC BLOOD PRESSURE: 79 MMHG

## 2024-04-29 DIAGNOSIS — M79.645 PAIN OF LEFT THUMB: Primary | ICD-10-CM

## 2024-04-29 PROCEDURE — 73130 X-RAY EXAM OF HAND: CPT

## 2024-04-29 PROCEDURE — 99212 OFFICE O/P EST SF 10 MIN: CPT | Performed by: NURSE PRACTITIONER

## 2024-04-29 PROCEDURE — G0463 HOSPITAL OUTPT CLINIC VISIT: HCPCS | Performed by: NURSE PRACTITIONER

## 2024-04-29 NOTE — Clinical Note
[facility] FSED [place] [id number] [place] [address] [place] [address]  Phone: [phone]    Rich Fletcher was seen and treated in our emergency department on 4/29/2024.  He may return to work on 04/30/2024.         Thank you for choosing [facility].    Jaylon Hopkins MD

## 2024-04-29 NOTE — FSED PROVIDER NOTE
EMERGENCY DEPARTMENT ENCOUNTER    Room Number:  08/08  Date seen:  4/29/2024  Time seen: 09:02 EDT  PCP: Erick Quick DO  Historian: Patient    Discussed/obtained information from independent historians: n/a    HPI:  Chief complaint: Left thumb pain  A complete HPI/ROS/PMH/PSH/SH/FH are unobtainable due to: n/a  Context:Rich Fletcher is a 33 y.o. male who presents to the ED with c/o left thumb pain. Pain is intermittent, rated as 1-2/10 and has been present since playing a golf game in February.  Denies loss of sensation.  Does play a lot of video games.  No h/o trauma.  Pain today is not any worse than usual but his wife recommended he come and get it checked out.      External (non-ED) record review:  no recent notes or visits    Chronic or social conditions impacting care: pt is a , this could be overuse type of injury.     ALLERGIES  Patient has no known allergies.    PAST MEDICAL HISTORY  Active Ambulatory Problems     Diagnosis Date Noted    No Active Ambulatory Problems     Resolved Ambulatory Problems     Diagnosis Date Noted    No Resolved Ambulatory Problems     No Additional Past Medical History       PAST SURGICAL HISTORY  History reviewed. No pertinent surgical history.    FAMILY HISTORY  History reviewed. No pertinent family history.    SOCIAL HISTORY  Social History     Socioeconomic History    Marital status:    Tobacco Use    Smoking status: Some Days     Types: Cigarettes    Tobacco comments:     Currently trying to quit   Vaping Use    Vaping status: Never Used   Substance and Sexual Activity    Alcohol use: Not Currently    Drug use: Not Currently    Sexual activity: Defer       REVIEW OF SYSTEMS  Review of Systems    All systems reviewed and negative except for those discussed in HPI.     PHYSICAL EXAM    I have reviewed the triage vital signs and nursing notes.  Vitals:    04/29/24 0859   BP: 139/79   Pulse: 67   Resp: 18   Temp: 98.2 °F (36.8 °C)   SpO2: 99%      Physical Exam    GENERAL: not distressed  HENT: nares patent  EYES: no scleral icterus  NECK: no ROM limitations  CV: regular rhythm, regular rate  RESPIRATORY: normal effort  ABDOMEN: soft  : deferred  MUSCULOSKELETAL: no deformity or pain to left hand.  No pain at CMC area.  Sensation, radial pulse and capillary refill normal.  No bony tenderness.   NEURO: alert, moves all extremities, follows commands  SKIN: warm, dry    RADIOLOGY RESULTS  XR Hand 3+ View Left    Result Date: 4/29/2024  XR HAND 3+ VW LEFT Date of Exam: 4/29/2024 9:18 AM EDT Indication: Injury Comparison: None available. Findings: 3 views. Joint compartments appear maintained and normally aligned. There is no fracture or dislocation. There are no degenerative changes.     Impression: Negative. Electronically Signed: Emilia Rider MD  4/29/2024 9:29 AM EDT  Workstation ID: [id number]      Ordered the above noted radiological studies.  Independently interpreted by me.  My findings will be discussed in the medical decision section below.     PROGRESS, DATA ANALYSIS, CONSULTS AND MEDICAL DECISION MAKING    Please note that this section constitutes my independent interpretation of clinical data including lab results, radiology, EKG's.  This constitutes my independent professional opinion regarding differential diagnosis and management of this patient.  It may include any factors such as history from outside sources, review of external records, social determinants of health, management of medications, response to those treatments, and discussions with other providers.    ED Course as of 04/29/24 1222   Mon Apr 29, 2024   0947 I viewed left hand images in PACS.  My independent interpretation is no acute fracture or dislocation.  [EW]      ED Course User Index  [EW] Xiomara Hinds APRN     Orders placed during this visit:  Orders Placed This Encounter   Procedures    XR Hand 3+ View Left            Medical Decision Making  Problems  Addressed:  Pain of left thumb: complicated acute illness or injury    Amount and/or Complexity of Data Reviewed  Radiology: ordered.      Pt presents with left thumb pain since February.  DDX considered are CMC arthritis, game keeper's thumb, fracture.  Imaging negative. No loss sensation, motor function and cap refill and pulses palpable. Will have patient use OTC Naproxen as needed for pain and will give name of Hand Provider for follow-up prn.       DIAGNOSIS  Final diagnoses:   Pain of left thumb          Medication List      No changes were made to your prescriptions during this visit.         FOLLOW-UP  Janet Crandall MD  [address] [facility] [address] [phone]    Schedule an appointment as soon as possible for a visit in 1 week  As needed, If symptoms worsen        Latest Documented Vital Signs:  As of 12:22 EDT  BP- 139/79 HR- 67 Temp- 98.2 °F (36.8 °C) O2 sat- 99%    Appropriate PPE utilized throughout this patient encounter to include mask, if indicated, per current protocol. Hand hygiene was performed before donning PPE and after removal when leaving the room.    Please note that portions of this were completed with a voice recognition program.     Note Disclaimer: At [facility], we believe that sharing information builds trust and better relationships. You are receiving this note because you are receiving care at [facility] or recently visited. It is possible you will see health information before a provider has talked with you about it. This kind of information can be easy to misunderstand. To help you fully understand what it means for your health, we urge you to discuss this note with your provider.

## 2024-04-29 NOTE — DISCHARGE INSTRUCTIONS
Try over the counter aleve for the thumb pain    X-Ray is normal    Follow up with Hand provider listed above if problems persist    Return Precautions    Although you are being discharged from the ED today, I encourage you to return for worsening symptoms.  Things can, and do, change such that treatment at home with medication may not be adequate.      Specifically, return for any of the following:    Chest pain, shortness of breath, pain or nausea and vomiting not controlled by medications provided.    Please make a follow up with your Primary Care Provider for a blood pressure recheck.